# Patient Record
Sex: FEMALE | Race: WHITE | URBAN - METROPOLITAN AREA
[De-identification: names, ages, dates, MRNs, and addresses within clinical notes are randomized per-mention and may not be internally consistent; named-entity substitution may affect disease eponyms.]

---

## 2021-12-02 ENCOUNTER — OFFICE VISIT (OUTPATIENT)
Dept: URBAN - METROPOLITAN AREA CLINIC 30 | Facility: CLINIC | Age: 71
End: 2021-12-02
Payer: MEDICARE

## 2021-12-02 PROCEDURE — 92020 GONIOSCOPY: CPT | Performed by: OPTOMETRIST

## 2021-12-02 PROCEDURE — 92134 CPTRZ OPH DX IMG PST SGM RTA: CPT | Performed by: OPTOMETRIST

## 2021-12-02 PROCEDURE — 99204 OFFICE O/P NEW MOD 45 MIN: CPT | Performed by: OPTOMETRIST

## 2021-12-02 PROCEDURE — 92132 CPTRZD OPH DX IMG ANT SGM: CPT | Performed by: OPTOMETRIST

## 2021-12-02 ASSESSMENT — INTRAOCULAR PRESSURE
OD: 15
OS: 15

## 2021-12-02 ASSESSMENT — KERATOMETRY
OS: 45.30
OD: 45.27

## 2021-12-02 ASSESSMENT — VISUAL ACUITY
OD: 20/40
OS: 20/40

## 2021-12-02 NOTE — IMPRESSION/PLAN
Impression: Combined forms of age-related cataract, bilateral: H25.813. Plan: Cataracts discussed today. Unable to dilate due to narrow angles. Will evaluate cataracts once able to dilate.

## 2021-12-02 NOTE — IMPRESSION/PLAN
Impression: Anatomical narrow angle, bilateral: H40.033. Gonio 12/21 Open to ant TM- no view of SS OU Plan: Discussed findings with patient. Reviewed s/sx of angle closure attack and pt to call asap if these occur. Avoid meds with glaucoma warnings in the meantime. Schedule consult to consider LPIs. Anterior seg OCT 12/21.

## 2022-02-23 ENCOUNTER — TESTING ONLY (OUTPATIENT)
Dept: URBAN - METROPOLITAN AREA CLINIC 30 | Facility: CLINIC | Age: 72
End: 2022-02-23
Payer: MEDICARE

## 2022-02-23 PROCEDURE — 92083 EXTENDED VISUAL FIELD XM: CPT | Performed by: OPHTHALMOLOGY

## 2022-03-02 ENCOUNTER — OFFICE VISIT (OUTPATIENT)
Dept: URBAN - METROPOLITAN AREA CLINIC 30 | Facility: CLINIC | Age: 72
End: 2022-03-02
Payer: MEDICARE

## 2022-03-02 DIAGNOSIS — H35.372 PUCKERING OF MACULA, LEFT EYE: ICD-10-CM

## 2022-03-02 DIAGNOSIS — H25.813 COMBINED FORMS OF AGE-RELATED CATARACT, BILATERAL: ICD-10-CM

## 2022-03-02 DIAGNOSIS — H35.81 RETINAL EDEMA: ICD-10-CM

## 2022-03-02 DIAGNOSIS — H40.033 ANATOMICAL NARROW ANGLE, BILATERAL: Primary | ICD-10-CM

## 2022-03-02 PROCEDURE — 99204 OFFICE O/P NEW MOD 45 MIN: CPT | Performed by: OPHTHALMOLOGY

## 2022-03-02 PROCEDURE — 92250 FUNDUS PHOTOGRAPHY W/I&R: CPT | Performed by: OPHTHALMOLOGY

## 2022-03-02 PROCEDURE — 92133 CPTRZD OPH DX IMG PST SGM ON: CPT | Performed by: OPHTHALMOLOGY

## 2022-03-02 PROCEDURE — 76514 ECHO EXAM OF EYE THICKNESS: CPT | Performed by: OPHTHALMOLOGY

## 2022-03-02 PROCEDURE — 92020 GONIOSCOPY: CPT | Performed by: OPHTHALMOLOGY

## 2022-03-02 ASSESSMENT — INTRAOCULAR PRESSURE
OD: 21
OS: 18

## 2022-03-02 NOTE — IMPRESSION/PLAN
Impression: Combined forms of age-related cataract, bilateral: H25.813. Plan: Will evaluate cataracts once able to dilate. Needs retinal clearance prior to cat surgery with Dr. Amina Ho Plan CEIOL removal in near future

## 2022-03-02 NOTE — IMPRESSION/PLAN
Impression: Anatomical narrow angle, bilateral: H40.033. Gonio 12/21 Open to ant TM- no view of SS OU Plan: Pt has NAG  Risk  Gonio : CARLOS temp /bare ou;TM i/n/s ou; 2+TMP OU -   Pachs: 576/577 Today's IOP: 21/18  Tmax : 
Pt denies Family hx of Glaucoma Right / Left eye is the better seeing eye  
C/D: 0.2 OU
OCT: 82/86 Pt denies Sulfa Allergy // Pt denies Lung /Heart dx Plan :
1. Recommend LPI; Discussed Risks and benefits of LPI. The patient was warned of signs and symptoms of angle closure glaucoma and the need for immediate follow-up. Discussed risks/benefits of laser peripheral iridotomy treatment. There is a possibility of need for additional sessions to complete LPI Discussed LPI does not lower pressure nor does it improve vision. If patient is on eye pressure reducing medication now, patient will still need to use them after LPI. A Ghost image or line in field of vision may be more evident in the bright light conditions. This usually resolves overtime. Also discussed possible need for further tx. Will rx Pred TID for 7 days then discontinue. 2. Pt agrees with plan and wishes to move forward 3. Schedule LPI OD then OS Risk Level 1
4.  Needs LPI prior to retinal appt

## 2022-03-02 NOTE — IMPRESSION/PLAN
Impression: Retinal edema: H35.81. Plan: Subretinal fluid OS and few drusen OD - plan for eval with retina after LPI Needs clearance from retina prior to cataract surgery with Dr. Sharyle Nones

## 2022-03-25 ENCOUNTER — SURGERY (OUTPATIENT)
Dept: URBAN - METROPOLITAN AREA SURGERY 12 | Facility: SURGERY | Age: 72
End: 2022-03-25
Payer: MEDICARE

## 2022-03-25 PROCEDURE — 66761 REVISION OF IRIS: CPT | Performed by: OPHTHALMOLOGY

## 2022-03-25 RX ORDER — PREDNISOLONE ACETATE 10 MG/ML
1 % SUSPENSION/ DROPS OPHTHALMIC
Qty: 5 | Refills: 1 | Status: ACTIVE
Start: 2022-03-25

## 2022-04-08 ENCOUNTER — SURGERY (OUTPATIENT)
Dept: URBAN - METROPOLITAN AREA SURGERY 12 | Facility: SURGERY | Age: 72
End: 2022-04-08
Payer: MEDICARE

## 2022-04-08 PROCEDURE — 66761 REVISION OF IRIS: CPT | Performed by: OPHTHALMOLOGY

## 2022-05-02 ENCOUNTER — OFFICE VISIT (OUTPATIENT)
Dept: URBAN - METROPOLITAN AREA CLINIC 30 | Facility: CLINIC | Age: 72
End: 2022-05-02
Payer: MEDICARE

## 2022-05-02 DIAGNOSIS — H35.3131 BILATERAL NONEXUDATIVE AGE-RELATED MACULAR DEGENERATION, EARLY DRY STAGE: ICD-10-CM

## 2022-05-02 DIAGNOSIS — H35.81 RETINAL EDEMA: ICD-10-CM

## 2022-05-02 PROCEDURE — 92134 CPTRZ OPH DX IMG PST SGM RTA: CPT | Performed by: OPHTHALMOLOGY

## 2022-05-02 PROCEDURE — 99214 OFFICE O/P EST MOD 30 MIN: CPT | Performed by: OPHTHALMOLOGY

## 2022-05-02 ASSESSMENT — INTRAOCULAR PRESSURE
OS: 14
OD: 17

## 2022-05-02 NOTE — IMPRESSION/PLAN
Impression: Cataract, bilateral: H25.813  LPI OU Plan: Will evaluate cataracts w Dr Yomi Rodriguez now that LPI done and OK to dilate. OK proceed Ce/IOL surgery.   KEEP f/u retina

## 2022-05-02 NOTE — IMPRESSION/PLAN
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 Plan: MILD DRY AMD -- Mild drusen / AMD dry OS > OD  Trace ERM only no concern. Long d/t pt:   Specialized examination and high-resolution imaging confirm retinal changes c/w DRY Macular Degeneration. PLAN:  AREDS2 vits rvw'd w pt ([facility]IST[facility]), diet, fish, Amsler, non-smoking. OK proceed Ce/IOL care.   RETINA PRN w Colors if CNVM evidence

## 2022-05-20 ENCOUNTER — TESTING ONLY (OUTPATIENT)
Dept: URBAN - METROPOLITAN AREA CLINIC 24 | Facility: CLINIC | Age: 72
End: 2022-05-20
Payer: MEDICARE

## 2022-05-20 DIAGNOSIS — Z01.818 ENCOUNTER FOR OTHER PREPROCEDURAL EXAMINATION: Primary | ICD-10-CM

## 2022-05-20 DIAGNOSIS — H25.813 COMBINED FORMS OF AGE-RELATED CATARACT, BILATERAL: ICD-10-CM

## 2022-05-20 PROCEDURE — 99203 OFFICE O/P NEW LOW 30 MIN: CPT | Performed by: PHYSICIAN ASSISTANT

## 2022-05-20 RX ORDER — ATORVASTATIN CALCIUM 20 MG/1
20 MG TABLET, FILM COATED ORAL
Qty: 0 | Refills: 0 | Status: ACTIVE
Start: 2022-05-20

## 2022-05-20 ASSESSMENT — PACHYMETRY
OS: 22.66
OD: 2.94
OD: 22.78
OS: 3.05

## 2022-06-02 ENCOUNTER — OFFICE VISIT (OUTPATIENT)
Dept: URBAN - METROPOLITAN AREA CLINIC 24 | Facility: CLINIC | Age: 72
End: 2022-06-02
Payer: MEDICARE

## 2022-06-02 DIAGNOSIS — H35.81 RETINAL EDEMA: ICD-10-CM

## 2022-06-02 DIAGNOSIS — H35.3131 BILATERAL NONEXUDATIVE AGE-RELATED MACULAR DEGENERATION, EARLY DRY STAGE: ICD-10-CM

## 2022-06-02 DIAGNOSIS — H40.033 ANATOMICAL NARROW ANGLE, BILATERAL: Primary | ICD-10-CM

## 2022-06-02 DIAGNOSIS — H25.813 COMBINED FORMS OF AGE-RELATED CATARACT, BILATERAL: ICD-10-CM

## 2022-06-02 PROCEDURE — 99214 OFFICE O/P EST MOD 30 MIN: CPT | Performed by: OPHTHALMOLOGY

## 2022-06-02 ASSESSMENT — VISUAL ACUITY
OD: 20/25
OS: 20/40

## 2022-06-02 ASSESSMENT — INTRAOCULAR PRESSURE
OS: 14
OD: 16

## 2022-06-02 NOTE — IMPRESSION/PLAN
Impression: Combined forms of age-related cataract, right eye: H25.811.  Plan: ok to preoceed with 2nd eye once 1st eye has been cleared

## 2022-06-02 NOTE — IMPRESSION/PLAN
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 Plan: MILD DRY AMD -- Mild drusen / AMD dry OS > OD  Trace ERM only no concern. continue to have monitored discussed may limit vision.

## 2022-06-02 NOTE — IMPRESSION/PLAN
Impression: Anatomical narrow angle, bilateral: H40.033. Gonio 12/21 Open to ant TM- no view of SS OU Plan: Pt has NAG  Risk  Gonio :   Pachs: 576/577 Today's IOP: 21/18  Tmax : 
Pt denies Family hx of Glaucoma Right / Left eye is the better seeing eye  
C/D: 0.2 OU
OCT: 82/86 Pt denies Sulfa Allergy // Pt denies Lung /Heart dx Plan :
1. s/p LPI OU 2.  Will proceed with cat surgery see cat plan

## 2022-06-02 NOTE — IMPRESSION/PLAN
Impression: Combined forms of age-related cataract, bilateral: H25.813. Plan: (( (( First eye OS/ Second eye OD, AIM - 0.25 OU:  DEXYCU 1st choice, Topical, declined ORA, NO LenSx)) - Min 15 Discussed cataract diagnosis with the patient. Appropriate testing ordered for cataract diagnosis prior to Preop. Risks and benefits of surgical treatment were discussed and understood. Patient desires surgical treatment. Discussed lens options with pt and pt is ok with wearing glasses after surgery. Patient desires surgery to proceed with surgery  ? OD/OS? Vivek Murray Both eyes examined, medically necessary due to impact in activities of daily living. Discussed higher risks with smaller pupil and discussed iris stretch and higher risks of bleeding.  Discussed there is a chance of developing capsular haze after surgery, which may be corrected with laser/yag

## 2022-06-16 ENCOUNTER — SURGERY (OUTPATIENT)
Dept: URBAN - METROPOLITAN AREA SURGERY 12 | Facility: SURGERY | Age: 72
End: 2022-06-16
Payer: MEDICARE

## 2022-06-16 DIAGNOSIS — H25.813 COMBINED FORMS OF AGE-RELATED CATARACT, BILATERAL: Primary | ICD-10-CM

## 2022-06-16 PROCEDURE — 66984 XCAPSL CTRC RMVL W/O ECP: CPT | Performed by: OPHTHALMOLOGY

## 2022-06-17 ENCOUNTER — POST-OPERATIVE VISIT (OUTPATIENT)
Dept: URBAN - METROPOLITAN AREA CLINIC 30 | Facility: CLINIC | Age: 72
End: 2022-06-17
Payer: MEDICARE

## 2022-06-17 DIAGNOSIS — Z48.810 ENCOUNTER FOR SURGICAL AFTERCARE FOLLOWING SURGERY ON A SENSE ORGAN: Primary | ICD-10-CM

## 2022-06-17 PROCEDURE — 99024 POSTOP FOLLOW-UP VISIT: CPT | Performed by: OPTOMETRIST

## 2022-06-17 ASSESSMENT — INTRAOCULAR PRESSURE: OS: 15

## 2022-06-17 NOTE — IMPRESSION/PLAN
Impression: S/P Cataract Extraction by phacoemulsification with IOL placement OS - 1 Day. Encounter for surgical aftercare following surgery on a sense organ  Z48.810. Plan: -0.25 aim. Use ATs TID-QID OU. AMD limits OS>OD
RTC as scheduled for 1 week PO.

## 2022-06-24 ENCOUNTER — POST-OPERATIVE VISIT (OUTPATIENT)
Dept: URBAN - METROPOLITAN AREA CLINIC 30 | Facility: CLINIC | Age: 72
End: 2022-06-24
Payer: MEDICARE

## 2022-06-24 DIAGNOSIS — Z48.810 ENCOUNTER FOR SURGICAL AFTERCARE FOLLOWING SURGERY ON A SENSE ORGAN: Primary | ICD-10-CM

## 2022-06-24 PROCEDURE — 99024 POSTOP FOLLOW-UP VISIT: CPT | Performed by: OPTOMETRIST

## 2022-06-24 ASSESSMENT — KERATOMETRY
OD: 45.58
OS: 45.22

## 2022-06-24 ASSESSMENT — VISUAL ACUITY
OD: 20/40
OS: 20/30

## 2022-06-24 ASSESSMENT — INTRAOCULAR PRESSURE
OD: 16
OS: 13

## 2022-06-24 NOTE — IMPRESSION/PLAN
Impression: S/P Cataract Extraction by phacoemulsification with IOL placement OS - 8 Days. Encounter for surgical aftercare following surgery on a sense organ  Z48.810. Plan: Use ATs TID-QID OU. 
doing well, retina limits (doctor verified refraction OS) Cleared to proceed c cataract surgery OD.

## 2022-07-12 ENCOUNTER — ADULT PHYSICAL (OUTPATIENT)
Dept: URBAN - METROPOLITAN AREA CLINIC 30 | Facility: CLINIC | Age: 72
End: 2022-07-12
Payer: MEDICARE

## 2022-07-12 DIAGNOSIS — Z01.818 ENCOUNTER FOR OTHER PREPROCEDURAL EXAMINATION: Primary | ICD-10-CM

## 2022-07-12 PROCEDURE — 99213 OFFICE O/P EST LOW 20 MIN: CPT | Performed by: PHYSICIAN ASSISTANT

## 2022-07-14 ENCOUNTER — SURGERY (OUTPATIENT)
Dept: URBAN - METROPOLITAN AREA SURGERY 12 | Facility: SURGERY | Age: 72
End: 2022-07-14
Payer: MEDICARE

## 2022-07-14 DIAGNOSIS — H25.811 COMBINED FORMS OF AGE-RELATED CATARACT, RIGHT EYE: Primary | ICD-10-CM

## 2022-07-14 PROCEDURE — 66984 XCAPSL CTRC RMVL W/O ECP: CPT | Performed by: OPHTHALMOLOGY

## 2022-07-15 ENCOUNTER — POST-OPERATIVE VISIT (OUTPATIENT)
Dept: URBAN - METROPOLITAN AREA CLINIC 30 | Facility: CLINIC | Age: 72
End: 2022-07-15
Payer: MEDICARE

## 2022-07-15 DIAGNOSIS — Z96.1 PRESENCE OF INTRAOCULAR LENS: Primary | ICD-10-CM

## 2022-07-15 PROCEDURE — 99024 POSTOP FOLLOW-UP VISIT: CPT | Performed by: OPTOMETRIST

## 2022-07-15 ASSESSMENT — INTRAOCULAR PRESSURE: OD: 16

## 2022-08-17 ENCOUNTER — POST-OPERATIVE VISIT (OUTPATIENT)
Dept: URBAN - METROPOLITAN AREA CLINIC 30 | Facility: CLINIC | Age: 72
End: 2022-08-17
Payer: MEDICARE

## 2022-08-17 DIAGNOSIS — H52.4 PRESBYOPIA: Primary | ICD-10-CM

## 2022-08-17 DIAGNOSIS — Z48.810 ENCOUNTER FOR SURGICAL AFTERCARE FOLLOWING SURGERY ON A SENSE ORGAN: ICD-10-CM

## 2022-08-17 PROCEDURE — 99024 POSTOP FOLLOW-UP VISIT: CPT | Performed by: OPTOMETRIST

## 2022-08-17 ASSESSMENT — VISUAL ACUITY
OD: 20/20
OS: 20/25

## 2022-08-17 ASSESSMENT — INTRAOCULAR PRESSURE
OD: 10
OS: 11

## 2022-08-17 ASSESSMENT — KERATOMETRY
OS: 45.30
OD: 45.40

## 2022-08-17 NOTE — IMPRESSION/PLAN
Impression:  Encounter for surgical aftercare following surgery on a sense organ  Z48.810. Plan: overall doing well, new spec rx issued
demonstrated previous OCT to pt-retina limits OS>OD
MAC OCT ordered today, but RNFL was done instead
discussed mild PC haze 6 months CE c MAC OCT/sooner prn

## 2022-12-08 ENCOUNTER — OFFICE VISIT (OUTPATIENT)
Dept: URBAN - METROPOLITAN AREA CLINIC 30 | Facility: CLINIC | Age: 72
End: 2022-12-08
Payer: MEDICARE

## 2022-12-08 DIAGNOSIS — H04.123 DRY EYE SYNDROME OF BILATERAL LACRIMAL GLANDS: ICD-10-CM

## 2022-12-08 DIAGNOSIS — H10.32 ACUTE CONJUNCTIVITIS OF LEFT EYE: Primary | ICD-10-CM

## 2022-12-08 PROCEDURE — 99212 OFFICE O/P EST SF 10 MIN: CPT

## 2022-12-08 RX ORDER — NEOMYCIN SULFATE, POLYMYXIN B SULFATE AND DEXAMETHASONE 3.5; 10000; 1 MG/ML; [USP'U]/ML; MG/ML
SUSPENSION OPHTHALMIC
Qty: 5 | Refills: 0 | Status: ACTIVE
Start: 2022-12-08

## 2022-12-08 ASSESSMENT — INTRAOCULAR PRESSURE
OD: 12
OS: 12

## 2022-12-08 NOTE — IMPRESSION/PLAN
Impression: Acute conjunctivitis of left eye: H10.32. Plan: Begin Maxitrol TID OS (gave sample of Tobradex ST).

## 2022-12-08 NOTE — IMPRESSION/PLAN
Impression: Dry eye syndrome of bilateral lacrimal glands: H04.123. Plan: Dry eyes account for the patient's complaints. There is no evidence of permanent changes to the cornea. Explained condition does not have a cure and will need artificial tears for maintenance. Patient instructed to use artificial tears (Systane or Refresh) 4-6x/daily. Explained it may take time for eyes to acclimate completely to OTC gtt regimen.

## 2022-12-15 ENCOUNTER — OFFICE VISIT (OUTPATIENT)
Dept: URBAN - METROPOLITAN AREA CLINIC 30 | Facility: CLINIC | Age: 72
End: 2022-12-15
Payer: MEDICARE

## 2022-12-15 DIAGNOSIS — H04.123 DRY EYE SYNDROME OF BILATERAL LACRIMAL GLANDS: ICD-10-CM

## 2022-12-15 DIAGNOSIS — H10.32 ACUTE CONJUNCTIVITIS OF LEFT EYE: Primary | ICD-10-CM

## 2022-12-15 PROCEDURE — 99212 OFFICE O/P EST SF 10 MIN: CPT

## 2022-12-15 ASSESSMENT — INTRAOCULAR PRESSURE
OD: 14
OS: 14

## 2023-02-27 ENCOUNTER — OFFICE VISIT (OUTPATIENT)
Dept: URBAN - METROPOLITAN AREA CLINIC 30 | Facility: CLINIC | Age: 73
End: 2023-02-27
Payer: MEDICARE

## 2023-02-27 DIAGNOSIS — H35.3131 BILATERAL NONEXUDATIVE AGE-RELATED MACULAR DEGENERATION, EARLY DRY STAGE: ICD-10-CM

## 2023-02-27 DIAGNOSIS — H04.123 DRY EYE SYNDROME OF BILATERAL LACRIMAL GLANDS: ICD-10-CM

## 2023-02-27 DIAGNOSIS — H35.372 PUCKERING OF MACULA, LEFT EYE: Primary | ICD-10-CM

## 2023-02-27 PROCEDURE — 92014 COMPRE OPH EXAM EST PT 1/>: CPT | Performed by: OPTOMETRIST

## 2023-02-27 PROCEDURE — 92134 CPTRZ OPH DX IMG PST SGM RTA: CPT | Performed by: OPTOMETRIST

## 2023-02-27 ASSESSMENT — KERATOMETRY
OS: 45.27
OD: 45.09

## 2023-02-27 ASSESSMENT — VISUAL ACUITY
OD: 20/20
OS: 20/25

## 2023-02-27 ASSESSMENT — INTRAOCULAR PRESSURE
OD: 12
OS: 11

## 2023-02-27 NOTE — IMPRESSION/PLAN
Impression: Puckering of macula, left eye: H35.372. Plan: Appears generally stable on MAC OCT. Pt educ, demonstrated OCT to pt. Call if decrease in vision occurs.

## 2023-02-27 NOTE — IMPRESSION/PLAN
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 Plan: MILD DRY AMD -- Mild drusen / AMD dry OS > OD  per Dr Yasir Rubin, appears stable on exam/OCT. PLAN:  cont AREDS2 vits ([facility]), diet, UV protection, Amsler grid reviewed today. RETINA PRN w Colors if CNVM evidence. Call if decrease in vision occurs.

## 2023-02-27 NOTE — IMPRESSION/PLAN
Impression: Dry eye syndrome of bilateral lacrimal glands: H04.123. Plan: Mostly asymptomatic, discussed s/sx of dry eye and will use Systane prn.

## 2023-05-30 ENCOUNTER — OFFICE VISIT (OUTPATIENT)
Dept: URBAN - METROPOLITAN AREA CLINIC 30 | Facility: CLINIC | Age: 73
End: 2023-05-30
Payer: MEDICARE

## 2023-05-30 DIAGNOSIS — H01.112 ALLERGIC DERMATITIS OF RIGHT LOWER EYELID: ICD-10-CM

## 2023-05-30 DIAGNOSIS — H01.114 ALLERGIC DERMATITIS OF LEFT UPPER EYELID: ICD-10-CM

## 2023-05-30 DIAGNOSIS — H01.115 ALLERGIC DERMATITIS OF LEFT LOWER EYELID: ICD-10-CM

## 2023-05-30 DIAGNOSIS — H01.111 ALLERGIC DERMATITIS OF RIGHT UPPER EYELID: Primary | ICD-10-CM

## 2023-05-30 PROCEDURE — 99214 OFFICE O/P EST MOD 30 MIN: CPT | Performed by: OPTOMETRIST

## 2023-05-30 RX ORDER — METHYLPREDNISOLONE 4 MG/1
4 MG TABLET ORAL
Qty: 21 | Refills: 0 | Status: ACTIVE
Start: 2023-05-30

## 2023-05-30 NOTE — IMPRESSION/PLAN
Impression: Allergic dermatitis of right upper eyelid: H01.111. Plan: Start Medrol Jeyson- use as directed per packet instructions. Follow dermatitis regimen treatment as follows: wash affected area gently c fingertips or non-abrasive cloth using Dove (unscented) or Cetaphil, pat skin dry, apply thin film of hydrocortisone, followed by a thin film of Aquaphor. Perform twice daily and once symptoms improve, can reduce frequency to qhs for maintenance. Handout c instruction given to pt.

## 2023-07-13 ENCOUNTER — OFFICE VISIT (OUTPATIENT)
Dept: URBAN - METROPOLITAN AREA CLINIC 30 | Facility: CLINIC | Age: 73
End: 2023-07-13
Payer: MEDICARE

## 2023-07-13 DIAGNOSIS — H01.111 ALLERGIC DERMATITIS OF RIGHT UPPER EYELID: Primary | ICD-10-CM

## 2023-07-13 DIAGNOSIS — H01.112 ALLERGIC DERMATITIS OF RIGHT LOWER EYELID: ICD-10-CM

## 2023-07-13 PROCEDURE — 99214 OFFICE O/P EST MOD 30 MIN: CPT

## 2023-07-13 RX ORDER — METHYLPREDNISOLONE 4 MG/1
4 MG TABLET ORAL
Qty: 0 | Refills: 0 | Status: ACTIVE
Start: 2023-07-13

## 2023-07-13 ASSESSMENT — INTRAOCULAR PRESSURE
OS: 12
OD: 12

## 2023-07-13 NOTE — IMPRESSION/PLAN
Impression: Allergic dermatitis of right upper eyelid: H01.111. Plan: Start Medrol Jeyson- use as directed per packet instructions. Follow dermatitis regimen treatment as follows: wash affected area gently c fingertips or non-abrasive cloth using Dove (unscented) or Cetaphil, pat skin dry, apply thin film of hydrocortisone, followed by a thin film of Aquaphor. Perform twice daily and once symptoms improve, can reduce frequency to qhs for maintenance. Recommend patient consult with Dermatology given persistent dermatitis episodes. May also see an allergist. 
Does note she has been going through some old boxes, could be a response to dust or other allergen.

## 2024-04-22 ENCOUNTER — OFFICE VISIT (OUTPATIENT)
Dept: URBAN - METROPOLITAN AREA CLINIC 30 | Facility: CLINIC | Age: 74
End: 2024-04-22
Payer: MEDICARE

## 2024-04-22 DIAGNOSIS — H35.372 PUCKERING OF MACULA, LEFT EYE: ICD-10-CM

## 2024-04-22 DIAGNOSIS — H35.3131 BILATERAL NONEXUDATIVE AGE-RELATED MACULAR DEGENERATION, EARLY DRY STAGE: Primary | ICD-10-CM

## 2024-04-22 DIAGNOSIS — H26.493 OTHER SECONDARY CATARACT, BILATERAL: ICD-10-CM

## 2024-04-22 DIAGNOSIS — H52.4 PRESBYOPIA: ICD-10-CM

## 2024-04-22 PROCEDURE — 92014 COMPRE OPH EXAM EST PT 1/>: CPT | Performed by: OPTOMETRIST

## 2024-04-22 PROCEDURE — 92134 CPTRZ OPH DX IMG PST SGM RTA: CPT | Performed by: OPTOMETRIST

## 2024-04-22 ASSESSMENT — INTRAOCULAR PRESSURE
OS: 14
OD: 15

## 2024-04-22 ASSESSMENT — VISUAL ACUITY
OS: 20/25
OD: 20/25

## 2024-04-22 ASSESSMENT — KERATOMETRY
OS: 45.16
OD: 45.24

## 2025-04-23 ENCOUNTER — OFFICE VISIT (OUTPATIENT)
Dept: URBAN - METROPOLITAN AREA CLINIC 30 | Facility: CLINIC | Age: 75
End: 2025-04-23
Payer: MEDICARE

## 2025-04-23 DIAGNOSIS — H01.111 ALLERGIC DERMATITIS OF RIGHT UPPER EYELID: ICD-10-CM

## 2025-04-23 DIAGNOSIS — H26.493 OTHER SECONDARY CATARACT, BILATERAL: ICD-10-CM

## 2025-04-23 DIAGNOSIS — H43.393 OTHER VITREOUS OPACITIES, BILATERAL: ICD-10-CM

## 2025-04-23 DIAGNOSIS — H01.112 ALLERGIC DERMATITIS OF RIGHT LOWER EYELID: ICD-10-CM

## 2025-04-23 DIAGNOSIS — H35.3131 NONEXUDATIVE AGE-RELATED MACULAR DEGENERATION, BILATERAL, EARLY DRY STAGE: Primary | ICD-10-CM

## 2025-04-23 DIAGNOSIS — H35.372 PUCKERING OF MACULA, LEFT EYE: ICD-10-CM

## 2025-04-23 PROCEDURE — 92134 CPTRZ OPH DX IMG PST SGM RTA: CPT | Performed by: OPTOMETRIST

## 2025-04-23 PROCEDURE — 92014 COMPRE OPH EXAM EST PT 1/>: CPT | Performed by: OPTOMETRIST

## 2025-04-23 ASSESSMENT — INTRAOCULAR PRESSURE
OD: 14
OS: 14

## 2025-04-23 ASSESSMENT — VISUAL ACUITY
OS: 20/30
OD: 20/20

## 2025-04-23 ASSESSMENT — KERATOMETRY
OS: 45.13
OD: 45.25